# Patient Record
Sex: FEMALE | URBAN - METROPOLITAN AREA
[De-identification: names, ages, dates, MRNs, and addresses within clinical notes are randomized per-mention and may not be internally consistent; named-entity substitution may affect disease eponyms.]

---

## 2019-10-24 ENCOUNTER — IMPORTED ENCOUNTER (OUTPATIENT)
Dept: URBAN - METROPOLITAN AREA CLINIC 9 | Facility: CLINIC | Age: 44
End: 2019-10-24

## 2020-01-16 NOTE — PATIENT DISCUSSION
Based on today’s exam, diagnostic studies, and/or review of records, the determination was made for treatment today. Minimal DME.

## 2020-01-16 NOTE — PROCEDURE NOTE: CLINICAL
PROCEDURE NOTE: Lucentis 0.3mg OD. Diagnosis: Diabetic Macular Edema. Anesthesia: Topical. Prep: Betadine Drops and Betadine Scrub. The patient was identified visually and verbally by the  surgeon. The procedure eye was marked with an adhesive arrow sticker. The  risks, benefits, alternatives and possible complications of the procedure  were discussed with the patient and informed consent was obtained. The  patient was positioned in the chair. After numerous topical anesthetic  drops were placed, subconjunctival lidocaine was administered. A speculum  was used to hold the eyelid open. A caliper was used to marked the site of  injection 3.5-4mm from the limbus. Betadine was placed on the site with a  swab and allowed to sit for thirty seconds. A sterile 30 or 31 guage needle with the  medication entered the vitreous cavity and the medication was  administered. The speculum was removed. The eye was copiously rinsed with  saline to remove all betadine, especially from the fornices. Gross vision  was assessed. If the patient wished an antibiotic ointment and eye patch  were applied. The patient was instructed to call immediately if any  significant visual loss, swelling, discharge, or pain occurred Intravitreal injection of Lucentis 0.3mg/0.05 ml was given. Patient tolerated the procedure well. There were no complications. CF vision checked. Post procedure instructions given. Jeff Conde PROCEDURE NOTE: Lucentis 0.3mg OS. Diagnosis: Diabetic Macular Edema. Anesthesia: Topical. Prep: Betadine Drops and Betadine Scrub. The patient was identified visually and verbally by the  surgeon. The procedure eye was marked with an adhesive arrow sticker. The  risks, benefits, alternatives and possible complications of the procedure  were discussed with the patient and informed consent was obtained. The  patient was positioned in the chair. After numerous topical anesthetic  drops were placed, subconjunctival lidocaine was administered. A speculum  was used to hold the eyelid open. A caliper was used to marked the site of  injection 3.5-4mm from the limbus. Betadine was placed on the site with a  swab and allowed to sit for thirty seconds. A sterile 30 or 31 guage needle with the  medication entered the vitreous cavity and the medication was  administered. The speculum was removed. The eye was copiously rinsed with  saline to remove all betadine, especially from the fornices. Gross vision  was assessed. If the patient wished an antibiotic ointment and eye patch  were applied. The patient was instructed to call immediately if any  significant visual loss, swelling, discharge, or pain occurred Intravitreal injection of Lucentis 0.3mg/0.05 ml was given. Patient tolerated the procedure well. There were no complications. CF vision checked. Post procedure instructions given. Jeff Conde

## 2020-01-16 NOTE — PATIENT DISCUSSION
Based on today’s exam, diagnostic studies, and/or review of records, the determination was made for treatment today. Moderate DME.

## 2020-01-16 NOTE — PATIENT DISCUSSION
Minimally Active. Discussed the importance of blood sugar control in the prevention of ocular complications.

## 2020-02-20 NOTE — PROCEDURE NOTE: CLINICAL
PROCEDURE NOTE: Lucentis 0.3mg OD. Diagnosis: Diabetic Macular Edema. The patient was identified visually and verbally by the  surgeon. The procedure eye was marked with an adhesive arrow sticker. The  risks, benefits, alternatives and possible complications of the procedure  were discussed with the patient and informed consent was obtained. The  patient was positioned in the chair. After numerous topical anesthetic  drops were placed, subconjunctival lidocaine was administered. A speculum  was used to hold the eyelid open. A caliper was used to marked the site of  injection 3.5-4mm from the limbus. Betadine was placed on the site with a  swab and allowed to sit for thirty seconds. A sterile 30 or 31 guage needle with the  medication entered the vitreous cavity and the medication was  administered. The speculum was removed. The eye was copiously rinsed with  saline to remove all betadine, especially from the fornices. Gross vision  was assessed. If the patient wished an antibiotic ointment and eye patch  were applied. The patient was instructed to call immediately if any  significant visual loss, swelling, discharge, or pain occurred Intravitreal injection of Lucentis 0.3mg/0.05 ml was given. Patient tolerated the procedure well. There were no complications. CF vision checked. Post procedure instructions given. Sarah Aminfisher PROCEDURE NOTE: Lucentis 0.3mg OS. Diagnosis: Diabetic Macular Edema. The patient was identified visually and verbally by the  surgeon. The procedure eye was marked with an adhesive arrow sticker. The  risks, benefits, alternatives and possible complications of the procedure  were discussed with the patient and informed consent was obtained. The  patient was positioned in the chair. After numerous topical anesthetic  drops were placed, subconjunctival lidocaine was administered. A speculum  was used to hold the eyelid open. A caliper was used to marked the site of  injection 3.5-4mm from the limbus. Betadine was placed on the site with a  swab and allowed to sit for thirty seconds. A sterile 30 or 31 guage needle with the  medication entered the vitreous cavity and the medication was  administered. The speculum was removed. The eye was copiously rinsed with  saline to remove all betadine, especially from the fornices. Gross vision  was assessed. If the patient wished an antibiotic ointment and eye patch  were applied. The patient was instructed to call immediately if any  significant visual loss, swelling, discharge, or pain occurred Intravitreal injection of Lucentis 0.3mg/0.05 ml was given. Patient tolerated the procedure well. There were no complications. CF vision checked. Post procedure instructions given. Sarah Martinez

## 2020-02-20 NOTE — PATIENT DISCUSSION
Based on today’s exam, diagnostic studies, and/or review of records, the determination was made for treatment today. Decreased DME, improved.  Patient receiving treatment q 5-6 weeks up [facility], recommend continuing treatment interval q 5-6 weeks.  Patient to follow up with Dr. Cecille Rehman in 5-6 weeks once back home then with Dr. Jesus Bach once they return to [place] in January 2021.

## 2020-02-20 NOTE — PATIENT DISCUSSION
Addended by: ALEYDA MCGUIRE on: 10/5/2019 08:56 AM     Modules accepted: Orders     Discussed with the patient the importance of good control of their blood sugar, blood pressure, cholesterol, diet, exercise, weight, and medication usage under the guidance of their diabetic doctor to prevent/halt diabetic retinopathy.

## 2020-02-20 NOTE — PATIENT DISCUSSION
Based on today’s exam, diagnostic studies, and/or review of records, the determination was made for treatment today. Decreased DME, improved.  Patient receiving treatment q 5-6 weeks up [facility], recommend continuing treatment interval q 5-6 weeks.  Patient to follow up with Dr. Harrison Burrell in 5-6 weeks once back home then with Dr. Raymundo Morales once they return to [place] in January 2021.

## 2021-10-18 ASSESSMENT — TONOMETRY
OS_IOP_MMHG: 13
OD_IOP_MMHG: 15

## 2021-10-18 ASSESSMENT — VISUAL ACUITY
OS_CC: 20/20 -2 SN
OD_CC: 20/25 SN
OS_CC: 20/20 - SN
OD_CC: 20/25 +2 SN

## 2022-10-21 NOTE — PATIENT DISCUSSION
Grade I hypertensive retinopathy.